# Patient Record
Sex: MALE | Race: WHITE
[De-identification: names, ages, dates, MRNs, and addresses within clinical notes are randomized per-mention and may not be internally consistent; named-entity substitution may affect disease eponyms.]

---

## 2019-08-14 ENCOUNTER — HOSPITAL ENCOUNTER (EMERGENCY)
Dept: HOSPITAL 54 - ER | Age: 39
Discharge: HOME | End: 2019-08-14
Payer: MEDICAID

## 2019-08-14 VITALS — WEIGHT: 142 LBS | HEIGHT: 65 IN | BODY MASS INDEX: 23.66 KG/M2

## 2019-08-14 VITALS — SYSTOLIC BLOOD PRESSURE: 113 MMHG | DIASTOLIC BLOOD PRESSURE: 72 MMHG

## 2019-08-14 DIAGNOSIS — E86.0: Primary | ICD-10-CM

## 2019-08-14 DIAGNOSIS — R11.2: ICD-10-CM

## 2019-08-14 DIAGNOSIS — Z60.2: ICD-10-CM

## 2019-08-14 DIAGNOSIS — Z59.0: ICD-10-CM

## 2019-08-14 LAB
BASOPHILS # BLD AUTO: 0.1 /CMM (ref 0–0.2)
BASOPHILS NFR BLD AUTO: 0.5 % (ref 0–2)
BUN SERPL-MCNC: 13 MG/DL (ref 7–18)
CALCIUM SERPL-MCNC: 9.5 MG/DL (ref 8.5–10.1)
CHLORIDE SERPL-SCNC: 103 MMOL/L (ref 98–107)
CO2 SERPL-SCNC: 31 MMOL/L (ref 21–32)
CREAT SERPL-MCNC: 0.9 MG/DL (ref 0.6–1.3)
EOSINOPHIL NFR BLD AUTO: 0.5 % (ref 0–6)
GLUCOSE SERPL-MCNC: 109 MG/DL (ref 74–106)
HCT VFR BLD AUTO: 48 % (ref 39–51)
HGB BLD-MCNC: 16.1 G/DL (ref 13.5–17.5)
LYMPHOCYTES NFR BLD AUTO: 1.4 /CMM (ref 0.8–4.8)
LYMPHOCYTES NFR BLD AUTO: 10 % (ref 20–44)
MCHC RBC AUTO-ENTMCNC: 34 G/DL (ref 31–36)
MCV RBC AUTO: 96 FL (ref 80–96)
MONOCYTES NFR BLD AUTO: 1 /CMM (ref 0.1–1.3)
MONOCYTES NFR BLD AUTO: 6.8 % (ref 2–12)
NEUTROPHILS # BLD AUTO: 11.9 /CMM (ref 1.8–8.9)
NEUTROPHILS NFR BLD AUTO: 82.2 % (ref 43–81)
PLATELET # BLD AUTO: 393 /CMM (ref 150–450)
POTASSIUM SERPL-SCNC: 4.4 MMOL/L (ref 3.5–5.1)
RBC # BLD AUTO: 5 MIL/UL (ref 4.5–6)
SODIUM SERPL-SCNC: 140 MMOL/L (ref 136–145)
WBC NRBC COR # BLD AUTO: 14.5 K/UL (ref 4.3–11)

## 2019-08-14 PROCEDURE — 85025 COMPLETE CBC W/AUTO DIFF WBC: CPT

## 2019-08-14 PROCEDURE — 80048 BASIC METABOLIC PNL TOTAL CA: CPT

## 2019-08-14 PROCEDURE — 96361 HYDRATE IV INFUSION ADD-ON: CPT

## 2019-08-14 PROCEDURE — 96374 THER/PROPH/DIAG INJ IV PUSH: CPT

## 2019-08-14 PROCEDURE — 99283 EMERGENCY DEPT VISIT LOW MDM: CPT

## 2019-08-14 PROCEDURE — 36415 COLL VENOUS BLD VENIPUNCTURE: CPT

## 2019-08-14 SDOH — ECONOMIC STABILITY - HOUSING INSECURITY: HOMELESSNESS: Z59.0

## 2019-08-14 SDOH — SOCIAL STABILITY - SOCIAL INSECURITY: PROBLEMS RELATED TO LIVING ALONE: Z60.2

## 2019-08-14 NOTE — NUR
JIMMIE. TO ER BED 4. AAOX4. LETHARGIC AND WEAK. EASILY ARROUSABLE. RESPONDS TO 
VERBAL STIMULIS. NO RESP DIDSTRESS NOTED. C/O DIZZYNESS AND WEAKNESS. PT STATES 
THAT HE HAVENT HAD A DRINK AND HAVENT EATEN SINCE THIS MORNING. PER EMS REPORT, 
PT WAS AT [facility] ASKING FOR WATER. PT IS BRADYCARDIC ON MONITOR. MD MCKEON DONE, 
ORDERS RECEIVED, NOTED AND CARRIED OUT.

## 2019-08-14 NOTE — NUR
IV removed. Catheter intact and site benign. Pressure and 4x4 applied to site. 
No bleeding noted.Patient discharged to home in stable condition. Written and 
verbal after care instructions given. Patient verbalizes understanding of 
instruction. Pt ambulatory with a steady gait